# Patient Record
Sex: FEMALE | Race: WHITE | NOT HISPANIC OR LATINO | URBAN - METROPOLITAN AREA
[De-identification: names, ages, dates, MRNs, and addresses within clinical notes are randomized per-mention and may not be internally consistent; named-entity substitution may affect disease eponyms.]

---

## 2023-07-28 ENCOUNTER — OFFICE (OUTPATIENT)
Dept: URBAN - METROPOLITAN AREA CLINIC 64 | Facility: CLINIC | Age: 68
End: 2023-07-28
Payer: COMMERCIAL

## 2023-07-28 VITALS
DIASTOLIC BLOOD PRESSURE: 84 MMHG | WEIGHT: 207 LBS | HEART RATE: 81 BPM | HEIGHT: 64 IN | SYSTOLIC BLOOD PRESSURE: 130 MMHG

## 2023-07-28 DIAGNOSIS — F43.9 REACTION TO SEVERE STRESS, UNSPECIFIED: ICD-10-CM

## 2023-07-28 DIAGNOSIS — R19.7 DIARRHEA, UNSPECIFIED: ICD-10-CM

## 2023-07-28 PROCEDURE — 99203 OFFICE O/P NEW LOW 30 MIN: CPT | Performed by: INTERNAL MEDICINE

## 2023-07-28 RX ORDER — COLESTIPOL HYDROCHLORIDE 1 G/1
3 TABLET, FILM COATED ORAL
Qty: 90 | Refills: 11 | Status: ACTIVE
Start: 2023-07-28

## 2024-05-07 ENCOUNTER — LAB (OUTPATIENT)
Dept: LAB | Facility: HOSPITAL | Age: 69
End: 2024-05-07
Payer: MEDICARE

## 2024-05-07 ENCOUNTER — TRANSCRIBE ORDERS (OUTPATIENT)
Dept: LAB | Facility: HOSPITAL | Age: 69
End: 2024-05-07
Payer: MEDICARE

## 2024-05-07 ENCOUNTER — HOSPITAL ENCOUNTER (OUTPATIENT)
Dept: CARDIOLOGY | Facility: HOSPITAL | Age: 69
Discharge: HOME | End: 2024-05-07
Payer: MEDICARE

## 2024-05-07 DIAGNOSIS — Z01.818 PRE-OP TESTING: Primary | ICD-10-CM

## 2024-05-07 DIAGNOSIS — Z01.818 PRE-OP TESTING: ICD-10-CM

## 2024-05-07 LAB
ALBUMIN SERPL-MCNC: 4.8 G/DL (ref 3.5–5.2)
ANION GAP SERPL CALCULATED.3IONS-SCNC: 12.6 MMOL/L (ref 5–15)
BASOPHILS # BLD AUTO: 0.05 10*3/MM3 (ref 0–0.2)
BASOPHILS NFR BLD AUTO: 0.7 % (ref 0–1.5)
BUN SERPL-MCNC: 13 MG/DL (ref 8–23)
BUN/CREAT SERPL: 14.3 (ref 7–25)
CALCIUM SPEC-SCNC: 10.6 MG/DL (ref 8.6–10.5)
CHLORIDE SERPL-SCNC: 103 MMOL/L (ref 98–107)
CO2 SERPL-SCNC: 25.4 MMOL/L (ref 22–29)
CREAT SERPL-MCNC: 0.91 MG/DL (ref 0.57–1)
DEPRECATED RDW RBC AUTO: 47 FL (ref 37–54)
EGFRCR SERPLBLD CKD-EPI 2021: 69.3 ML/MIN/1.73
EOSINOPHIL # BLD AUTO: 0.34 10*3/MM3 (ref 0–0.4)
EOSINOPHIL NFR BLD AUTO: 4.6 % (ref 0.3–6.2)
ERYTHROCYTE [DISTWIDTH] IN BLOOD BY AUTOMATED COUNT: 12.8 % (ref 12.3–15.4)
GLUCOSE SERPL-MCNC: 95 MG/DL (ref 65–99)
HBA1C MFR BLD: 5.5 % (ref 4.8–5.6)
HCT VFR BLD AUTO: 42.4 % (ref 34–46.6)
HGB BLD-MCNC: 13.6 G/DL (ref 12–15.9)
IMM GRANULOCYTES # BLD AUTO: 0.03 10*3/MM3 (ref 0–0.05)
IMM GRANULOCYTES NFR BLD AUTO: 0.4 % (ref 0–0.5)
LYMPHOCYTES # BLD AUTO: 1.17 10*3/MM3 (ref 0.7–3.1)
LYMPHOCYTES NFR BLD AUTO: 15.7 % (ref 19.6–45.3)
MCH RBC QN AUTO: 32.4 PG (ref 26.6–33)
MCHC RBC AUTO-ENTMCNC: 32.1 G/DL (ref 31.5–35.7)
MCV RBC AUTO: 101 FL (ref 79–97)
MONOCYTES # BLD AUTO: 0.57 10*3/MM3 (ref 0.1–0.9)
MONOCYTES NFR BLD AUTO: 7.6 % (ref 5–12)
MRSA DNA SPEC QL NAA+PROBE: NORMAL
NEUTROPHILS NFR BLD AUTO: 5.31 10*3/MM3 (ref 1.7–7)
NEUTROPHILS NFR BLD AUTO: 71 % (ref 42.7–76)
NRBC BLD AUTO-RTO: 0 /100 WBC (ref 0–0.2)
PLATELET # BLD AUTO: 221 10*3/MM3 (ref 140–450)
PMV BLD AUTO: 9.4 FL (ref 6–12)
POTASSIUM SERPL-SCNC: 4.4 MMOL/L (ref 3.5–5.2)
QT INTERVAL: 358 MS
QTC INTERVAL: 404 MS
RBC # BLD AUTO: 4.2 10*6/MM3 (ref 3.77–5.28)
SODIUM SERPL-SCNC: 141 MMOL/L (ref 136–145)
WBC NRBC COR # BLD AUTO: 7.47 10*3/MM3 (ref 3.4–10.8)

## 2024-05-07 PROCEDURE — 36415 COLL VENOUS BLD VENIPUNCTURE: CPT

## 2024-05-07 PROCEDURE — 83036 HEMOGLOBIN GLYCOSYLATED A1C: CPT

## 2024-05-07 PROCEDURE — 80048 BASIC METABOLIC PNL TOTAL CA: CPT

## 2024-05-07 PROCEDURE — 87641 MR-STAPH DNA AMP PROBE: CPT

## 2024-05-07 PROCEDURE — 82040 ASSAY OF SERUM ALBUMIN: CPT

## 2024-05-07 PROCEDURE — 93005 ELECTROCARDIOGRAM TRACING: CPT | Performed by: ORTHOPAEDIC SURGERY

## 2024-05-07 PROCEDURE — 85025 COMPLETE CBC W/AUTO DIFF WBC: CPT

## 2024-05-21 LAB
QT INTERVAL: 358 MS
QTC INTERVAL: 404 MS

## 2024-10-12 PROCEDURE — 87086 URINE CULTURE/COLONY COUNT: CPT | Performed by: NURSE PRACTITIONER

## 2024-10-14 ENCOUNTER — TELEPHONE (OUTPATIENT)
Dept: URGENT CARE | Facility: CLINIC | Age: 69
End: 2024-10-14
Payer: MEDICARE

## 2024-10-14 DIAGNOSIS — N30.90 CYSTITIS: Primary | ICD-10-CM

## 2024-10-14 RX ORDER — SULFAMETHOXAZOLE/TRIMETHOPRIM 800-160 MG
1 TABLET ORAL 2 TIMES DAILY
Qty: 10 TABLET | Refills: 0 | Status: SHIPPED | OUTPATIENT
Start: 2024-10-14 | End: 2024-10-19

## 2024-10-14 NOTE — TELEPHONE ENCOUNTER
Was treated recently for UTI with cefdinir. Having severe diarrhea with it, requesting another antibiotic. Will send bactrim to pharmacy, verbalizes understanding

## 2025-04-28 RX ORDER — MAGNESIUM OXIDE 400 MG/1
400 TABLET ORAL DAILY
COMMUNITY

## 2025-05-06 ENCOUNTER — ANESTHESIA EVENT (OUTPATIENT)
Dept: GASTROENTEROLOGY | Facility: HOSPITAL | Age: 70
End: 2025-05-06
Payer: MEDICARE

## 2025-05-06 NOTE — ANESTHESIA PREPROCEDURE EVALUATION
Anesthesia Evaluation     NPO Solid Status: > 8 hours  NPO Liquid Status: > 8 hours           Airway   Mallampati: I  TM distance: >3 FB  Neck ROM: full  No difficulty expected  Dental - normal exam     Pulmonary - normal exam   (+) ,sleep apnea  Cardiovascular - normal exam        Neuro/Psych  GI/Hepatic/Renal/Endo      Musculoskeletal     Abdominal  - normal exam    Bowel sounds: normal.   Substance History      OB/GYN          Other   arthritis,       Other Comment: History Comments History Comments  Lupus  Fibromyalgia   Osteoarthritis  Raynaud disease   Osteoporosis  Sleep apnea     Surgical History  Current as of 05/06/25 0951    TOTAL HIP ARTHROPLASTY      ROS/Med Hx Other: LUPUS   URI JANUARY PREDNISONE              Anesthesia Plan    ASA 2     general   total IV anesthesia  intravenous induction     Anesthetic plan, risks, benefits, and alternatives have been provided, discussed and informed consent has been obtained with: patient.  Pre-procedure education provided  Plan discussed with CRNA.    CODE STATUS:

## 2025-05-07 ENCOUNTER — HOSPITAL ENCOUNTER (OUTPATIENT)
Facility: HOSPITAL | Age: 70
Setting detail: HOSPITAL OUTPATIENT SURGERY
Discharge: HOME | End: 2025-05-07
Attending: INTERNAL MEDICINE | Admitting: INTERNAL MEDICINE
Payer: MEDICARE

## 2025-05-07 ENCOUNTER — ANESTHESIA (OUTPATIENT)
Dept: GASTROENTEROLOGY | Facility: HOSPITAL | Age: 70
End: 2025-05-07
Payer: MEDICARE

## 2025-05-07 VITALS
DIASTOLIC BLOOD PRESSURE: 59 MMHG | SYSTOLIC BLOOD PRESSURE: 112 MMHG | BODY MASS INDEX: 31.72 KG/M2 | WEIGHT: 185.8 LBS | RESPIRATION RATE: 19 BRPM | TEMPERATURE: 98.5 F | HEART RATE: 70 BPM | HEIGHT: 64 IN | OXYGEN SATURATION: 99 %

## 2025-05-07 PROCEDURE — 25010000002 PROPOFOL 200 MG/20ML EMULSION: Performed by: NURSE ANESTHETIST, CERTIFIED REGISTERED

## 2025-05-07 PROCEDURE — 25810000003 LACTATED RINGERS PER 1000 ML: Performed by: NURSE ANESTHETIST, CERTIFIED REGISTERED

## 2025-05-07 PROCEDURE — 25010000002 LIDOCAINE PF 1% 1 % SOLUTION: Performed by: NURSE ANESTHETIST, CERTIFIED REGISTERED

## 2025-05-07 RX ORDER — SODIUM CHLORIDE 9 MG/ML
9 INJECTION, SOLUTION INTRAVENOUS ONCE
Status: DISCONTINUED | OUTPATIENT
Start: 2025-05-07 | End: 2025-05-07 | Stop reason: HOSPADM

## 2025-05-07 RX ORDER — LIDOCAINE HYDROCHLORIDE 10 MG/ML
INJECTION, SOLUTION EPIDURAL; INFILTRATION; INTRACAUDAL; PERINEURAL AS NEEDED
Status: DISCONTINUED | OUTPATIENT
Start: 2025-05-07 | End: 2025-05-07 | Stop reason: SURG

## 2025-05-07 RX ORDER — ONDANSETRON 2 MG/ML
4 INJECTION INTRAMUSCULAR; INTRAVENOUS ONCE AS NEEDED
Status: DISCONTINUED | OUTPATIENT
Start: 2025-05-07 | End: 2025-05-07 | Stop reason: HOSPADM

## 2025-05-07 RX ORDER — SODIUM CHLORIDE, SODIUM LACTATE, POTASSIUM CHLORIDE, CALCIUM CHLORIDE 600; 310; 30; 20 MG/100ML; MG/100ML; MG/100ML; MG/100ML
INJECTION, SOLUTION INTRAVENOUS CONTINUOUS PRN
Status: DISCONTINUED | OUTPATIENT
Start: 2025-05-07 | End: 2025-05-07 | Stop reason: SURG

## 2025-05-07 RX ORDER — PROPOFOL 10 MG/ML
INJECTION, EMULSION INTRAVENOUS AS NEEDED
Status: DISCONTINUED | OUTPATIENT
Start: 2025-05-07 | End: 2025-05-07 | Stop reason: SURG

## 2025-05-07 RX ADMIN — PROPOFOL 100 MG: 10 INJECTION, EMULSION INTRAVENOUS at 09:32

## 2025-05-07 RX ADMIN — PROPOFOL 50 MG: 10 INJECTION, EMULSION INTRAVENOUS at 09:35

## 2025-05-07 RX ADMIN — LIDOCAINE HYDROCHLORIDE 50 MG: 10 INJECTION, SOLUTION EPIDURAL; INFILTRATION; INTRACAUDAL; PERINEURAL at 09:39

## 2025-05-07 RX ADMIN — SODIUM CHLORIDE, SODIUM LACTATE, POTASSIUM CHLORIDE, AND CALCIUM CHLORIDE: .6; .31; .03; .02 INJECTION, SOLUTION INTRAVENOUS at 09:26

## 2025-05-07 RX ADMIN — PROPOFOL 50 MG: 10 INJECTION, EMULSION INTRAVENOUS at 09:39

## 2025-05-07 RX ADMIN — PROPOFOL 25 MG: 10 INJECTION, EMULSION INTRAVENOUS at 09:46

## 2025-05-07 RX ADMIN — PROPOFOL 25 MG: 10 INJECTION, EMULSION INTRAVENOUS at 09:51

## 2025-05-07 RX ADMIN — LIDOCAINE HYDROCHLORIDE 50 MG: 10 INJECTION, SOLUTION EPIDURAL; INFILTRATION; INTRACAUDAL; PERINEURAL at 09:32

## 2025-05-07 NOTE — H&P
GI Procedure H&P:    Referring Provider:    Alla Baird MD Harrell, Steven, MD    Chief complaint: <principal problem not specified>    Subjective .  Screening colonoscopy    History of present illness:      Kalee Pedersen is a 69 y.o. female who presents today for Procedure(s):  COLONOSCOPY for the indications listed below.     The updated Patient Profile was reviewed prior to the procedure, in conjunction with the Physical Exam, including medical conditions, surgical procedures, medications, allergies, family history and social history.     Pre-operatively, I reviewed the indication(s) for the procedure, the risks of the procedure [including but not limited to: unexpected bleeding possibly requiring hospitalization and/or unplanned repeat procedures, perforation possibly requiring surgical treatment, missed lesions and complications of sedation/MAC (also explained by anesthesia staff)].     I have evaluated the patient for risks associated with the planned anesthesia and the procedure to be performed and find the patient an acceptable candidate for IV sedation.    Multiple opportunities were provided for any questions or concerns, and all questions were answered satisfactorily before any anesthesia was administered. We will proceed with the planned procedure.    Past Medical History:  Past Medical History:   Diagnosis Date    Fibromyalgia     Lupus     Osteoarthritis     Osteoporosis     Raynaud disease     Sleep apnea        Past Surgical History:  Past Surgical History:   Procedure Laterality Date    TOTAL HIP ARTHROPLASTY Right 05/10/2024       Social History:  Social History     Tobacco Use    Smoking status: Never     Passive exposure: Never    Smokeless tobacco: Never   Vaping Use    Vaping status: Never Used   Substance Use Topics    Alcohol use: Never    Drug use: Never       Family History:  History reviewed. No pertinent family history.    Medications:  Medications Prior to Admission   Medication Sig  Dispense Refill Last Dose/Taking    albuterol (PROVENTIL) (5 MG/ML) 0.5% nebulizer solution Inhale.   Taking    Aspirin 81 MG capsule Take 81 mg by mouth Daily.   Taking    azaTHIOprine (IMURAN) 50 MG tablet Take 2 tablets by mouth Daily.   Taking    B Complex Vitamins (vitamin b complex) capsule capsule Take  by mouth Daily.   Taking    Calcium Carb-Cholecalciferol (Liquid Calcium with D3) 600-25 MG-MCG capsule Take  by mouth.   Taking    colestipol (COLESTID) 1 g tablet Take 1 tablet by mouth 3 (Three) Times a Day.   Taking    Cranberry 50 MG chewable tablet Chew.   Taking    dicyclomine (BENTYL) 20 MG tablet    Taking    DULoxetine (CYMBALTA) 30 MG capsule Take 1 capsule by mouth Daily.   Taking    fexofenadine (ALLEGRA) 60 MG tablet Take 1 tablet by mouth Daily.   Taking    fluticasone (FLONASE) 50 MCG/ACT nasal spray Administer 1 spray into the nostril(s) as directed by provider Daily.   Taking    Fluticasone-Salmeterol (ADVAIR/WIXELA) 500-50 MCG/ACT DISKUS Inhale 1 puff 2 (Two) Times a Day. 60 each 0 Taking    LORazepam (ATIVAN) 1 MG tablet Take 1 tablet by mouth Every 12 (Twelve) Hours.   Taking    magnesium oxide (MAG-OX) 400 MG tablet Take 1 tablet by mouth Daily.   Taking    pregabalin (LYRICA) 75 MG capsule Take 1 capsule by mouth Daily.   Taking    Romosozumab-aqqg (Evenity) 105 MG/1.17ML subcutaneous solution Inject  under the skin into the appropriate area as directed. Monthly   Taking    triamcinolone (KENALOG) 0.1 % paste Apply 1 dose to the mouth or throat.   Taking    predniSONE (DELTASONE) 10 MG (21) dose pack Take  by mouth Daily. Use as directed on package 21 each 0        Scheduled Meds:  Continuous Infusions:No current facility-administered medications for this encounter.    PRN Meds:.    ALLERGIES:  Nsaids, Clindamycin, Hydroxychloroquine sulfate, Macrolides and ketolides, Nitrofurantoin, and Penicillins    ROS:  The following systems were reviewed and negative;   Constitution:  No fevers,  "chills, no unintentional weight loss  Skin: no rash, no jaundice  Eyes:  No blurry vision, no eye pain  HENT:  No change in hearing or smell  Resp:  No dyspnea or cough  CV:  No chest pain or palpitations  :  No dysuria, hematuria  Musculoskeletal:  No leg cramps or arthralgias  Neuro:  No tremor, no numbness  Psych:  No depression or confsuion    Objective     Vital Signs:   Vitals:    04/28/25 1218   Weight: 78.5 kg (173 lb)   Height: 162.6 cm (64\")       Physical Exam:       General Appearance:    Awake and alert, in no acute distress   Head:    Normocephalic, without obvious abnormality, atraumatic   Throat:   No oral lesions, no thrush, oral mucosa moist   Lungs:     respirations regular, even and unlabored   Skin:   No rash, no jaundice       Results Review:  Lab Results (last 24 hours)       ** No results found for the last 24 hours. **            Imaging Results (Last 24 Hours)       ** No results found for the last 24 hours. **             I reviewed the patient's labs and imaging.    ASSESSMENT AND PLAN:  Screening colonoscopy    Active Problems:    * No active hospital problems. *       Procedure(s):  COLONOSCOPY      I discussed the patients findings and my recommendations with the patient.    Jorge Gutiérrez MD  05/07/25  09:01 EDT    "

## 2025-05-07 NOTE — ANESTHESIA POSTPROCEDURE EVALUATION
Patient: Kalee Pedersen    Procedure Summary       Date: 05/07/25 Room / Location: [facility] ENDOSCOPY 1 / [facility] ENDOSCOPY    Anesthesia Start: 0926 Anesthesia Stop: 0955    Procedure: COLONOSCOPY Diagnosis:       Screening for colon cancer      (Screening for colon cancer [Z12.11])    Surgeons: Jorge Gutiérrez MD Provider: Jackson López MD    Anesthesia Type: general ASA Status: 2            Anesthesia Type: general    Vitals  Vitals Value Taken Time   /59 05/07/25 10:21   Temp     Pulse 70 05/07/25 10:21   Resp     SpO2 99 % 05/07/25 10:21           Post Anesthesia Care and Evaluation    Patient location during evaluation: PACU  Patient participation: complete - patient participated  Level of consciousness: awake  Pain scale: See nurse's notes for pain score.  Pain management: adequate    Airway patency: patent  Anesthetic complications: No anesthetic complications  PONV Status: none  Cardiovascular status: acceptable  Respiratory status: acceptable and spontaneous ventilation  Hydration status: acceptable    Comments: Patient seen and examined postoperatively; vital signs stable; SpO2 greater than or equal to 90%; cardiopulmonary status stable; nausea/vomiting adequately controlled; pain adequately controlled; no apparent anesthesia complications; patient discharged from anesthesia care when discharge criteria were met

## 2025-05-07 NOTE — DISCHARGE INSTRUCTIONS
Dr. Gutiérrez [phone]      A responsible adult should stay with you and you should rest quietly for the rest of the day.    Do not drink alcohol, drive, operate any heavy machinery or power tools or make any legal/important decisions for the next 24 hours.     If you begin to experience severe pain, increased shortness of breath, racing heartbeat or a fever above 101 F, seek immediate medical attention.     Follow up with MD as instructed. Call office for results in 3 to 5 business days if needed.       Findings:  Moderate left-sided diverticulosis with medium openings  Otherwise normal colonoscopy to the cecum     Impression:  Screening colonoscopy with no polyps seen     Recommendations:  High-fiber diet  No recall colonoscopy

## 2025-05-07 NOTE — OP NOTE
COLONOSCOPY Procedure Report    Patient Name:  Kalee Pedersen  YOB: 1955    Date of Surgery:  5/7/2025     Pre-Op Diagnosis:  Screening for colon cancer [Z12.11]       Post-Op Diagnosis Codes:     * Screening for colon cancer [Z12.11]  Moderate left-sided diverticulosis with medium openings  Otherwise normal colonoscopy to the cecum    Procedure/CPT® Codes:  No CPT Code Applied in Case Entry    Procedure(s):  COLONOSCOPY    Staff:  Surgeon(s):  Jorge Gutiérrez MD      Anesthesia: Monitored Anesthesia Care    Description of Procedure:  A description of the procedure as well as risks, benefits and alternative methods were explained to the patient who voiced understanding and signed the corresponding consent form. A physical exam was performed and vital signs were monitored throughout the procedure.    A rectal exam was performed which was normal. An Olympus colonoscope was placed into the rectum and proceeded under direct visualization through the colon until the cecum and appendiceal orifice were identified. Careful visualization occurred upon slow withdraw of the scope. The scope was then retroflexed and the distal rectum was visualized. The quality of the prep was good. The procedure was not difficult and there were no immediate complications.    Specimen:        See Below    Estimated blood loss: none    Complications:  None    Findings:  Moderate left-sided diverticulosis with medium openings  Otherwise normal colonoscopy to the cecum    Impression:  Screening colonoscopy with no polyps seen    Recommendations:  High-fiber diet  No recall colonoscopy      Jorge Gutiérrez MD     Date: 5/7/2025    Time: 09:31 EDT

## (undated) DEVICE — PK ENDO GI 50